# Patient Record
Sex: MALE | Race: WHITE | URBAN - METROPOLITAN AREA
[De-identification: names, ages, dates, MRNs, and addresses within clinical notes are randomized per-mention and may not be internally consistent; named-entity substitution may affect disease eponyms.]

---

## 2021-07-14 ENCOUNTER — WEB ENCOUNTER (OUTPATIENT)
Dept: URBAN - METROPOLITAN AREA CLINIC 105 | Facility: CLINIC | Age: 41
End: 2021-07-14

## 2021-07-14 ENCOUNTER — OFFICE VISIT (OUTPATIENT)
Dept: URBAN - METROPOLITAN AREA CLINIC 105 | Facility: CLINIC | Age: 41
End: 2021-07-14
Payer: COMMERCIAL

## 2021-07-14 ENCOUNTER — DASHBOARD ENCOUNTERS (OUTPATIENT)
Age: 41
End: 2021-07-14

## 2021-07-14 VITALS
DIASTOLIC BLOOD PRESSURE: 85 MMHG | HEIGHT: 74 IN | BODY MASS INDEX: 28.44 KG/M2 | TEMPERATURE: 97.3 F | SYSTOLIC BLOOD PRESSURE: 120 MMHG | HEART RATE: 66 BPM | WEIGHT: 221.6 LBS

## 2021-07-14 DIAGNOSIS — E28.2 PCOS (POLYCYSTIC OVARIAN SYNDROME): ICD-10-CM

## 2021-07-14 DIAGNOSIS — F10.10 EXCESSIVE DRINKING ALCOHOL: ICD-10-CM

## 2021-07-14 DIAGNOSIS — R74.01 ELEVATED TRANSAMINASE LEVEL: ICD-10-CM

## 2021-07-14 PROCEDURE — 99204 OFFICE O/P NEW MOD 45 MIN: CPT | Performed by: INTERNAL MEDICINE

## 2021-07-14 RX ORDER — TOLVAPTAN 15 MG/1
1 TABLET TABLET ORAL ONCE A DAY
Status: ACTIVE | COMMUNITY

## 2021-07-14 RX ORDER — ESOMEPRAZOLE SODIUM 40 MG/5ML
AS DIRECTED INJECTION INTRAVENOUS
Status: ACTIVE | COMMUNITY

## 2021-07-14 RX ORDER — LISINOPRIL 10 MG/1
1 TABLET TABLET ORAL ONCE A DAY
Status: ACTIVE | COMMUNITY

## 2021-07-14 NOTE — HPI-TODAY'S VISIT:
The patient presents for elevated transaminase level.  Today, the patient says he does not remember being told about his elevated liver enzymes years ago. Last alcohol use was 1.5 months ago - previously was drinking 2 liters of whiskey/2 days for years. He says Dr. Alcaraz was aware of his excessive alcohol use and has been counseling him to stop for years as well. His next appt with Dr. Alcaraz is in August - usually sees him every 3 months, but Kieran wants to see him monthly now for labs.   He presented to a GI a couple years ago for indigestion and reflux. At the time he was prescribed esomeprazole which the patient is taking currently. He notes some nausea/vomiting. Recent CT on May 30th ([facility] in [place], [place]) noted kidney stones (has passed).  Labs 7/8/21 - CMP normal except ALT 80, AST 45. 5/6/21 - CMP normal except creatinine 1.39, ALT 86, AST 94. 1/18/21 - CMP normal except creatinine 1.49, , AST 78. 10/8/20 - CBC normal. 8/27/20 - CMP normal except creatinine 1.51, AST 46, ALT 67.

## 2021-07-17 PROBLEM — 237055002: Status: ACTIVE | Noted: 2021-07-17

## 2021-07-17 PROBLEM — 160592001: Status: ACTIVE | Noted: 2021-07-14

## 2021-08-10 LAB
A/G RATIO: 1.7
ACTIN (SMOOTH MUSCLE) ANTIBODY: 4
ALBUMIN: 4.5
ALKALINE PHOSPHATASE: 66
ALT (SGPT): 42
ANA DIRECT: NEGATIVE
AST (SGOT): 24
BASO (ABSOLUTE): 0
BASOS: 1
BILIRUBIN, TOTAL: 0.5
BUN/CREATININE RATIO: 14
BUN: 18
CALCIUM: 9.8
CARBON DIOXIDE, TOTAL: 22
CHLORIDE: 103
CHOLESTEROL, TOTAL: 274
COMMENT:: (no result)
CREATINE KINASE,TOTAL: 95
CREATININE: 1.33
EGFR IF AFRICN AM: 75
EGFR IF NONAFRICN AM: 65
EOS (ABSOLUTE): 0.5
EOS: 9
FERRITIN, SERUM: 165
GLOBULIN, TOTAL: 2.7
GLUCOSE: 95
HBSAG SCREEN: NEGATIVE
HDL CHOLESTEROL: 31
HEMATOCRIT: 46.9
HEMATOLOGY COMMENTS:: (no result)
HEMOGLOBIN: 16.1
HEP A AB, IGM: NEGATIVE
HEP B CORE AB, IGM: NEGATIVE
HEP C VIRUS AB: <0.1
IGG, IMMUNOGLOBULIN G (RDL): 944
IMMATURE CELLS: (no result)
IMMATURE GRANS (ABS): 0
IMMATURE GRANULOCYTES: 0
IMMUNOGLOBULIN A, QN, SERUM: 332
IRON BIND.CAP.(TIBC): 359
IRON SATURATION: 23
IRON: 82
LDL CHOL CALC (NIH): 150
LIVER-KIDNEY MICROSOMAL AB: 1.2
LYMPHS (ABSOLUTE): 2.1
LYMPHS: 33
MCH: 32.9
MCHC: 34.3
MCV: 96
MITOCHONDRIAL (M2) ANTIBODY: <20
MONOCYTES(ABSOLUTE): 0.7
MONOCYTES: 11
NEUTROPHILS (ABSOLUTE): 2.9
NEUTROPHILS: 46
NRBC: (no result)
PLATELETS: 261
POTASSIUM: 4.4
PROTEIN, TOTAL: 7.2
RBC: 4.9
RDW: 11.5
SODIUM: 141
T-TRANSGLUTAMINASE (TTG) IGA: <2
T4,FREE(DIRECT): 1.17
TRIGLYCERIDES: 489
TSH: 2.61
UIBC: 277
VLDL CHOLESTEROL CAL: 93
WBC: 6.3